# Patient Record
Sex: FEMALE | Race: WHITE | Employment: STUDENT | URBAN - METROPOLITAN AREA
[De-identification: names, ages, dates, MRNs, and addresses within clinical notes are randomized per-mention and may not be internally consistent; named-entity substitution may affect disease eponyms.]

---
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
## 2025-05-22 ENCOUNTER — HOSPITAL ENCOUNTER (EMERGENCY)
Age: 20
Discharge: HOME | End: 2025-05-22

## 2025-05-22 VITALS
DIASTOLIC BLOOD PRESSURE: 87 MMHG | WEIGHT: 245.9 LBS | RESPIRATION RATE: 18 BRPM | BODY MASS INDEX: 45.25 KG/M2 | TEMPERATURE: 98.7 F | OXYGEN SATURATION: 97 % | HEIGHT: 62 IN | SYSTOLIC BLOOD PRESSURE: 137 MMHG | HEART RATE: 88 BPM

## 2025-05-22 DIAGNOSIS — N89.8 VAGINAL DISCHARGE: ICD-10-CM

## 2025-05-22 DIAGNOSIS — J02.9 ACUTE PHARYNGITIS, UNSPECIFIED ETIOLOGY: Primary | ICD-10-CM

## 2025-05-22 LAB
BACTERIA GENITAL QL WET PREP: NORMAL
BACTERIA URNS QL MICRO: NORMAL /HPF
BILIRUB UR QL STRIP.AUTO: NEGATIVE
CLARITY UR: CLEAR
CLUE CELLS SPEC QL WET PREP: NORMAL
COLOR UR: YELLOW
EPI CELLS #/AREA URNS AUTO: 5 /HPF (ref 0–5)
EPI CELLS SPEC QL WET PREP: NORMAL
FLUAV RNA RESP QL NAA+PROBE: NOT DETECTED
FLUBV RNA RESP QL NAA+PROBE: NOT DETECTED
GLUCOSE UR STRIP.AUTO-MCNC: NEGATIVE MG/DL
HCG UR QL: NEGATIVE
HGB UR QL STRIP.AUTO: NEGATIVE
HYALINE CASTS #/AREA URNS AUTO: 2 /LPF (ref 0–8)
KETONES UR STRIP.AUTO-MCNC: ABNORMAL MG/DL
LEUKOCYTE ESTERASE UR QL STRIP.AUTO: ABNORMAL
NITRITE UR QL STRIP.AUTO: NEGATIVE
PH UR STRIP.AUTO: 6.5 [PH] (ref 5–8)
PROT UR STRIP.AUTO-MCNC: NEGATIVE MG/DL
RBC CLUMPS #/AREA URNS AUTO: 1 /HPF (ref 0–4)
RBC SPEC QL WET PREP: NORMAL
S PYO AG THROAT QL: NEGATIVE
SARS-COV-2 RNA RESP QL NAA+PROBE: NOT DETECTED
SP GR UR STRIP.AUTO: 1.01 (ref 1–1.03)
SPECIMEN SOURCE FLD: NORMAL
T VAGINALIS GENITAL QL WET PREP: NORMAL
UA COMPLETE W REFLEX CULTURE PNL UR: ABNORMAL
UA DIPSTICK W REFLEX MICRO PNL UR: YES
URN SPEC COLLECT METH UR: ABNORMAL
UROBILINOGEN UR STRIP-ACNC: 1 E.U./DL
WBC #/AREA URNS AUTO: 3 /HPF (ref 0–5)
WBC SPEC QL WET PREP: NORMAL
YEAST GENITAL QL WET PREP: NORMAL

## 2025-05-22 PROCEDURE — 87636 SARSCOV2 & INF A&B AMP PRB: CPT

## 2025-05-22 PROCEDURE — 87880 STREP A ASSAY W/OPTIC: CPT

## 2025-05-22 PROCEDURE — 87491 CHLMYD TRACH DNA AMP PROBE: CPT

## 2025-05-22 PROCEDURE — 81001 URINALYSIS AUTO W/SCOPE: CPT

## 2025-05-22 PROCEDURE — 99283 EMERGENCY DEPT VISIT LOW MDM: CPT

## 2025-05-22 PROCEDURE — 84703 CHORIONIC GONADOTROPIN ASSAY: CPT

## 2025-05-22 PROCEDURE — 87591 N.GONORRHOEAE DNA AMP PROB: CPT

## 2025-05-22 PROCEDURE — 87210 SMEAR WET MOUNT SALINE/INK: CPT

## 2025-05-22 RX ORDER — LEVOTHYROXINE SODIUM 150 UG/1
150 TABLET ORAL DAILY
COMMUNITY
Start: 2025-01-29

## 2025-05-22 ASSESSMENT — LIFESTYLE VARIABLES
HOW OFTEN DO YOU HAVE A DRINK CONTAINING ALCOHOL: MONTHLY OR LESS
HOW MANY STANDARD DRINKS CONTAINING ALCOHOL DO YOU HAVE ON A TYPICAL DAY: 1 OR 2

## 2025-05-22 ASSESSMENT — ENCOUNTER SYMPTOMS
RHINORRHEA: 0
NAUSEA: 0
ABDOMINAL PAIN: 0
SORE THROAT: 1
VOMITING: 0
SHORTNESS OF BREATH: 0
DIARRHEA: 0
WHEEZING: 0
COUGH: 0

## 2025-05-22 ASSESSMENT — PAIN - FUNCTIONAL ASSESSMENT: PAIN_FUNCTIONAL_ASSESSMENT: 0-10

## 2025-05-22 ASSESSMENT — PAIN SCALES - GENERAL
PAINLEVEL_OUTOF10: 7
PAINLEVEL_OUTOF10: 1

## 2025-05-22 ASSESSMENT — PAIN DESCRIPTION - LOCATION: LOCATION: THROAT

## 2025-05-22 NOTE — ED PROVIDER NOTES
[facility] EMERGENCY DEPARTMENT  EMERGENCY DEPARTMENT ENCOUNTER        Pt Name: Richa Ignacio  MRN: [id number]  Birthdate 2005  Date of evaluation: 5/22/2025  Provider: Milka Travis PA-C  PCP: Andrés Buckley MD  Note Started: 11:09 AM EDT 5/22/25      GERDA. I have evaluated this patient.        CHIEF COMPLAINT       Chief Complaint   Patient presents with    Pharyngitis     Pt states sore throat and cough, cough x5 months, sore throat since Monday, cough worse, over counter meds not helping, nasal congestion as well and mucus    Exposure to STD     Pt states doesn't feel right down there and is also 3 days late on period and possible she is pregnant       HISTORY OF PRESENT ILLNESS: 1 or more Elements     History From: patient   Limitations to history : None    Richa Ignacio is a 20 y.o. female who presents for evaluation of sore throat x 3 days.  States that she just got her tongue pierced and is concerned that it may be related.  She also has had some nasal congestion and cough on and off for months.  She states that she \"does not feel right down there\" as well and is 3 days late for her period.  She wants to be checked for STDs and pregnancy.  No abdominal pain.  No nausea vomiting diarrhea.  No fevers or chills.  No sores rashes or lesions.  She reports some increasing vaginal discharge that smells \"chemically.\"  She has no other complaints or concerns at this time    Nursing Notes were all reviewed and agreed with or any disagreements were addressed in the HPI.    REVIEW OF SYSTEMS :      Review of Systems   Constitutional:  Negative for appetite change, chills and fever.   HENT:  Positive for sore throat. Negative for congestion and rhinorrhea.    Respiratory:  Negative for cough, shortness of breath and wheezing.    Cardiovascular:  Negative for chest pain.   Gastrointestinal:  Negative for abdominal pain, diarrhea, nausea and vomiting.   Genitourinary:  Positive for vaginal  dictation.    EKG: When ordered, EKG's are interpreted by the Emergency Department Physician in the absence of a cardiologist.  Please see their note for interpretation of EKG.    RADIOLOGY:   Non-plain film images such as CT, Ultrasound and MRI are read by the radiologist.     Interpretation per the Radiologist below, if available at the time of this note:    No orders to display     No results found.      ED Provider US Interpretation.    No results found.    PROCEDURES   Unless otherwise noted below, none     Procedures      CRITICAL CARE TIME (.cctime)       PAST MEDICAL HISTORY      has no past medical history on file.     EMERGENCY DEPARTMENT COURSE and DIFFERENTIAL DIAGNOSIS/MDM:   Vitals:    Vitals:    05/22/25 1045 05/22/25 1222   BP: 137/87    Pulse: (!) 105 88   Resp: 18    Temp: 98.7 °F (37.1 °C)    TempSrc: Oral    SpO2: 97%    Weight: 111.5 kg (245 lb 14.4 oz)    Height: 1.575 m (5' 2\")        Is this patient to be included in the SEP-1 Core Measure due to severe sepsis or septic shock?   No   Exclusion criteria - the patient is NOT to be included for SEP-1 Core Measure due to:  2+ SIRS criteria are not met    Patient was given the following medications:  Medications - No data to display          Chronic Conditions affecting care:    has no past medical history on file.    CONSULTS: (Who and What was discussed)  None      Social Determinants Significantly Affecting Health : None    Records Reviewed (External, Source and Summary) n/a    CC/HPI Summary, DDx, ED Course, and Reassessment: Patient presents for evaluation of sore throat and cough, also with vaginal discharge and concern for pregnancy.  On exam, she is resting comfortably in bed in no acute distress and nontoxic.  Vitals are within normal limits and she is afebrile.  Lungs are clear to auscultation bilaterally.  Chest is nontender and abdomen is benign.  Posteropharynx is erythematous.  No tonsillar hypertrophy or exudates.  Uvula is midline.

## 2025-05-23 LAB
C TRACH DNA CVX QL NAA+PROBE: NEGATIVE
N GONORRHOEA DNA CERV MUCUS QL NAA+PROBE: NEGATIVE